# Patient Record
(demographics unavailable — no encounter records)

---

## 2024-10-24 NOTE — PHYSICAL EXAM
[Chaperone Present] : A chaperone was present in the examining room during all aspects of the physical examination [[id number]] : A chaperone was present during the pelvic exam. [FreeTextEntry2] : Ramon [Appropriately responsive] : appropriately responsive [Alert] : alert [No Acute Distress] : no acute distress [Soft] : soft [Non-tender] : non-tender [Non-distended] : non-distended [Oriented x3] : oriented x3 [Examination Of The Breasts] : a normal appearance [No Masses] : no breast masses were palpable [Labia Majora] : normal [Labia Minora] : normal [Normal] : normal [Uterine Adnexae] : normal

## 2024-10-24 NOTE — HISTORY OF PRESENT ILLNESS
[TextBox_4] : 62yo presents for annual exam no complaints  recent shoulder surgery - states hemoglobin has been 10, usually 13 - so has appt to f/u with GI. Did have gastritis from the pain meds [Mammogramdate] : 2024 [PapSmeardate] : 2023 [BoneDensityDate] : 2024 [ColonoscopyDate] : utd

## 2024-11-04 NOTE — HISTORY OF PRESENT ILLNESS
[FreeTextEntry1] : Soraya presents for follow-up visit.  She had called in May after having shoulder surgery for epigastric abdominal pain while taking naproxen.  At that time I placed her on omeprazole 20 mg in the morning and famotidine 40 mg at bedtime.  She felt better with the pain resolving.  She still continues on the above medication.  However recent blood work showing new onset anemia with hemoglobin around 10.4.  Prior blood work with normal hemoglobin.  She denies nausea or vomiting.  She denies changes in bowel habits.  She denies rectal bleeding or melena.  She denies weight loss.  Colonoscopy in 2022 unremarkable.

## 2024-11-04 NOTE — PHYSICAL EXAM
[Alert] : alert [Normal Voice/Communication] : normal voice/communication [Healthy Appearing] : healthy appearing [No Acute Distress] : no acute distress [Sclera] : the sclera and conjunctiva were normal [Hearing Threshold Finger Rub Not [place]] : hearing was normal [Normal Lips/Gums] : the lips and gums were normal [Oropharynx] : the oropharynx was normal [Normal Appearance] : the appearance of the neck was normal [No Neck Mass] : no neck mass was observed [No Respiratory Distress] : no respiratory distress [No Acc Muscle Use] : no accessory muscle use [Respiration, Rhythm And Depth] : normal respiratory rhythm and effort [Auscultation Breath Sounds / Voice Sounds] : lungs were clear to auscultation bilaterally [Heart Rate And Rhythm] : heart rate was normal and rhythm regular [Normal S1, S2] : normal S1 and S2 [Murmurs] : no murmurs [Bowel Sounds] : normal bowel sounds [Abdomen Tenderness] : non-tender [No Masses] : no abdominal mass palpated [Abdomen Soft] : soft [] : no hepatosplenomegaly [Oriented To Time, Place, And Person] : oriented to person, place, and time

## 2024-11-04 NOTE — ASSESSMENT
[FreeTextEntry1] : This is a pleasant 63-year-old female who after her shoulder surgery was taking naproxen for which she developed epigastric abdominal pain.  She had since stopped the naproxen and has been taking omeprazole 20 mg in the morning and famotidine 40 mg at bedtime.  She is recently found to have mild anemia.  She denies melena or rectal bleeding.  She states that she was feeling tired but now feels fine.  Last blood work was over 2 months ago.  I recommend repeating a CBC.  If there is evidence of persistent or worsening anemia, consideration be given to an upper endoscopy.  She agrees with this plan.  In the meantime she will continue continue to take omeprazole and famotidine.  I recommend stool for H. pylori antigen.  She reports that she has been having loose yellow stools for the past couple of days.  She thinks that she may have eaten something bad or developed viral gastroenteritis.  I recommend stool GI PCR.

## 2024-12-10 NOTE — DISCUSSION/SUMMARY
[de-identified] : Pt is a pleasant 63 year old female status post right shoulder arthroscopic complex retracted attenuated DRRTCR and biceps tenotomy and SAD and subscapularis repair. Patient has improved ROM compared to prior visit. A new PT script was provided today to further improve her ROM. Pt informed of force couple mechanism providing coverage to rotator cuff, which improved her ER and IR, however with mild limitations of her forward flexion. Pt informed two out of her 3 planes of motion have improved. Pt to follow up in May at 1 year follow up. All questions were answered.

## 2024-12-10 NOTE — PHYSICAL EXAM
[UE] : Sensory: Intact in bilateral upper extremities [B.R.] : biceps 2+ and symmetric bilaterally [Rad] : radial 2+ and symmetric bilaterally [de-identified] : Pt is a pleasant 63 year old female, AAOx3 with no apparent distress, 35.3 BMI. Physical examination of right shoulder reveals normal contours with no deformity, skin intact, with no signs of infection, no erythema, no swelling, no discoloration, no distal lymphedema, no patholaxity, no muscle atrophy noted. ROM of right shoulder reveals FF A90, P130, ER 65, IR T11.  Supraspinatus, otherwise motor strength 5/5 throughout the arc of motion. No neurological deficits noted.

## 2024-12-10 NOTE — CONSULT LETTER
[Dear  ___] : Dear  [unfilled], [FreeTextEntry1] : I had the pleasure of evaluating your patient in the office today for routine follow up s/p Right shoulder rotator cuff repair on 5/16/24. I have enclosed a copy of today's office notes for your charts and for your review.  Sincerely,   Clinton Eden M.D. Professor and  Department of Orthopedic Surgery [facility]

## 2024-12-10 NOTE — HISTORY OF PRESENT ILLNESS
[de-identified] : 64 y/o female s/p 5/16/2024 Right shoulder arthroscopic complex RTCR with 80% coverage with subscapularis repair, SAD and biceps tenotomy is overall doing well. She is going to PT 2 x week at [facility]. Her pain is controlled with Tylenol and Advil.   Hx: low HCT having Endoscopy.

## 2024-12-10 NOTE — REASON FOR VISIT
[Follow-Up Visit] : a follow-up visit for [FreeTextEntry2] : Evaluation of right shoulder pain s/p 5/16/2024 Right shoulder arthroscopic complex RTCR with 80-80% coverage with subscapularis repair, SAD and biceps tenotomy

## 2025-06-06 NOTE — ADDENDUM
[FreeTextEntry1] : This note was written by Antonio Natarajan on 06/06/2025 acting as scribe for Dr. Ry Krause M.D.  I, Dr. Ry Krause, have read and attest that all the information, medical decision making and discharge instructions within are true and accurate.

## 2025-06-06 NOTE — PROCEDURE
[de-identified] : LEFT KNEE CORTISONE INJECTION Discussed at length with the patient the planned steroid and lidocaine injection. The risks, benefits, convalescence and alternatives were reviewed. The possible side effects discussed included but were not limited to: pain, swelling, heat and redness. These symptoms are generally mild but if they are extensive then contact the office. Giving pain relievers by mouth such as NSAIDs or Tylenol can generally treat the reactions to steroid and lidocaine. Rare cases of infection have been noted. Rash, hives and itching may occur post injection. If you have muscle pain or cramps, flushing and or swelling of the face, rapid heart beat, nausea, dizziness, fever, chills, headache, difficulty breathing, swelling in the arms or legs, or have a prickly feeling of your skin, contact a health care provider immediately. Following this discussion, the knee was prepped with betadine and under sterile conditions 5 cc of 2% lidocaine and 1 cc depo-medrol (40mg) were injected with a 21 gauge needle. The needle was introduced into the joint, aspiration was performed to ensure intra-articular placement and the medication was injected. Upon withdrawal of the needle the site was cleaned with alcohol and a bandaid applied. The patient tolerated the injection well and there were no adverse effects. Post injection instructions included no strenuous activity for 24 hours, cryotherapy and if there are any adverse effects to contact the office.

## 2025-06-06 NOTE — HISTORY OF PRESENT ILLNESS
[de-identified] :  CASPER MARY 64 year old female presents for follow up evaluation of left knee pain. She states the pain began 2 months ago. She also notes some back pain. She saw her PCP who prescribed her Cyclobenzaprine. She states the pain is on the lateral aspect of the knee. She has pain getting up from sitting position. She has no issue with level walking. She has a hard time navigating stairs. She worse a nee sleeve but states it is uncomfortable. She takes Tylenol as needed for pain relief. She has gone to physical therapy in the past and states it helped. She has an allergy to Codene. She works for [employer].

## 2025-06-06 NOTE — PHYSICAL EXAM
[de-identified] : General appearance: well-nourished and hydrated, pleasant, alert and oriented x 3, cooperative. HEENT: Normocephalic, EOM intact, Nasal septum midline, Oral cavity clear, External auditory canal clear. Cardiovascular: no apparent abnormalities, no lower leg edema, no varicosities, pedal pulses are palpable. Lymphatics Lymph nodes: none palpated, Lymphedema: not present. Neurologic: sensation is normal, no muscle weakness in upper or lower extremities, patella tendon reflexes intact. Dermatologic: no apparent skin lesions, moist, warm, no rash. Spine: cervical spine appears normal and moves freely, thoracic spine appears normal and moves freely, lumbosacral spine appears normal and moves freely. Gait: nonantalgic.   Left knee Inspection: mild effusion. Wounds: none. Alignment: 12-degrees Valgus. Palpation: medial and lateral tenderness on palpation. ROM active (in degrees):0-110 with crepitus and discomfort  Ligamentous laxity: all ligaments appear stable, negative ant. drawer test, negative post. drawer test, stable to varus stress test, stable to valgus stress test. negative Lachman's test, negative pivot shift test Meniscal Test: negative McMurrays, negative Javier. Patellofemoral Alignment Test: Q angle-, normal. Muscle Test: good quad strength. Leg examination: calf is soft and non-tender.   Right knee Inspection: no effusion or erythema. Wounds: none. Alignment: normal. Palpation: no specific tenderness on palpation. ROM active (in degrees): 0-125  Ligamentous laxity: all ligaments appear stable, negative ant. drawer test, negative post. drawer test, stable to varus stress test, stable to valgus stress test. negative Lachman's test, negative pivot shift test Meniscal Test: negative McMurrays, negative Javier. Patellofemoral Alignment Test: Q angle-, normal. Muscle Test: good quad strength. Leg examination: calf is soft and non-tender.   Left hip Inspection: No swelling or ecchymosis. Wounds: none. Palpation: non-tender. Stability: no instability. Strength: 5/5 all motor groups. ROM: no pain with FROM. Leg length: equal.   Right hip Inspection: No swelling or ecchymosis. Wounds: none. Palpation: non-tender. Stability: no instability. Strength: 5/5 all motor groups. ROM: no pain with FROM. Leg length: equal.   Left ankle Inspection: no erythema noted, no swelling noted. Palpation: no pain on palpation . ROM: FROM without crepitus. Muscle strength: 5/5. Stability: no instability noted.   Right ankle Inspection: no erythema noted, no swelling noted. ROM: FROM without crepitus. Palpation: no pain on palpation . Muscle strength: 5/5. Stability: no instability noted.   Left foot Inspection: color, texture and turgor are normal. ROM: full range of motion of all joints without pain or crepitus. Palpation: no tenderness. Stability: no instability noted.   Right foot Inspection: color, texture and turgor are normal. ROM: full range of motion of all joints without pain or crepitus. Palpation: no tenderness. Stability: no instability noted.   Left shoulder Inspection: no muscle asymmetry, no atrophy. Palpation: no tenderness noted, ACJ non-tender. ROM: full active ROM, full passive ROM. Strength testing): anterior deltoid, supraspinatus, infraspinatus, subscapularis all 5/5. Stability test: ant. apprehension negative, post. apprehension negative, relocation test negative. Impingement Test: negative NEER.   Right shoulder Inspection: no muscle asymmetry, no atrophy. Limited elevation  Palpation: no tenderness noted, ACJ non-tender. ROM: full active ROM, full passive ROM. Strength testing): anterior deltoid, supraspinatus, infraspinatus, subscapularis all 5/5. Stability test: ant. apprehension negative, post. apprehension negative, relocation test negative. Impingement Test: negative NEER. Surgical Wounds: none.   Left elbow Inspection: negative swelling. Wounds: none. Palpation: non-tender. ROM: full ROM. Strength: 5/5 all groups. Stability: no instability. Mass: none.   Right elbow Inspection: negative swelling. Wounds: none. Palpation: non-tender. ROM: full ROM. Strength: 5/5 all groups. Stability: no instability. Mass: none.   Left wrist Inspection: negative swelling. Wound: none. Palpation (bone): no tenderness. ROM: full ROM. Strength: full , good.   Right wrist Inspection: negative swelling. Wound: none. Palpation (bone): no tenderness. ROM: full ROM. Strength: full , good.   Left hand Inspection: no skin changes, normal appearance. Wounds: none. Strength: full , able to make full fist. Sensation: light touch intact all fingers and thumb. Vascular: good capillary refill < 3 seconds, all fingers and thumb. Mass: none.   Right hand Inspection: no skin changes, normal appearance. Wounds: none. Strength: full , able to make full fist. Sensation: light touch intact all fingers and thumb. Vascular: good capillary refill < 3 seconds, all fingers and thumb. Mass: none.    [de-identified] : Left knee x-rays, standing AP/Lateral and Merchant films, and 45-degree PA standing view, taken at the office today shows diffuse tricompartmental degenerative arthritis, valgus deformity, marginal osteophytes, lateral bone on bone sclerosis, patellofemoral joint space narrowing, peripheral osteophytes, Kellgren and Mahad grade 4.   Right knee x-ray merchant view taken at the office today demonstrates joint space narrowing, osteophytes, and a well centered patella.

## 2025-06-06 NOTE — DISCUSSION/SUMMARY
[de-identified] : Discussed at length the nature of the patient's condition. Their left knee symptoms appear secondary to severe degenerative arthritis with a Valgus deformity. I have discussed the natural history of the condition with the patient. I do not believe surgery is warranted at this time. Her acute pain was treated today with a left cortisone injection. She will attend physical therapy and continue the use of her Tylenol for pain relief.   Patient can continue home exercises, Tylenol, weight management and activities as tolerated. All questions were answered, understanding verbalized. Patient is in agreement with plan of treatment. Patient may follow up in 2-3 months.